# Patient Record
Sex: FEMALE
[De-identification: names, ages, dates, MRNs, and addresses within clinical notes are randomized per-mention and may not be internally consistent; named-entity substitution may affect disease eponyms.]

---

## 2024-07-02 ENCOUNTER — NON-APPOINTMENT (OUTPATIENT)
Age: 59
End: 2024-07-02

## 2024-07-03 ENCOUNTER — NON-APPOINTMENT (OUTPATIENT)
Age: 59
End: 2024-07-03

## 2024-07-03 ENCOUNTER — APPOINTMENT (OUTPATIENT)
Dept: ORTHOPEDIC SURGERY | Facility: CLINIC | Age: 59
End: 2024-07-03
Payer: COMMERCIAL

## 2024-07-03 VITALS — WEIGHT: 182 LBS | BODY MASS INDEX: 34.36 KG/M2 | HEIGHT: 61 IN

## 2024-07-03 DIAGNOSIS — M17.11 UNILATERAL PRIMARY OSTEOARTHRITIS, RIGHT KNEE: ICD-10-CM

## 2024-07-03 PROBLEM — Z00.00 ENCOUNTER FOR PREVENTIVE HEALTH EXAMINATION: Status: ACTIVE | Noted: 2024-07-03

## 2024-07-03 PROCEDURE — 99203 OFFICE O/P NEW LOW 30 MIN: CPT

## 2024-10-02 ENCOUNTER — APPOINTMENT (OUTPATIENT)
Age: 59
End: 2024-10-02

## 2024-10-02 VITALS
OXYGEN SATURATION: 98 % | HEART RATE: 84 BPM | WEIGHT: 175 LBS | BODY MASS INDEX: 34.36 KG/M2 | SYSTOLIC BLOOD PRESSURE: 139 MMHG | HEIGHT: 60 IN | DIASTOLIC BLOOD PRESSURE: 81 MMHG

## 2024-10-02 DIAGNOSIS — Z78.9 OTHER SPECIFIED HEALTH STATUS: ICD-10-CM

## 2024-10-02 DIAGNOSIS — M25.561 PAIN IN RIGHT KNEE: ICD-10-CM

## 2024-10-02 DIAGNOSIS — M17.11 UNILATERAL PRIMARY OSTEOARTHRITIS, RIGHT KNEE: ICD-10-CM

## 2024-10-02 PROCEDURE — 99204 OFFICE O/P NEW MOD 45 MIN: CPT

## 2024-10-02 RX ORDER — ACETAMINOPHEN 325 MG/1
TABLET ORAL
Refills: 0 | Status: ACTIVE | COMMUNITY

## 2024-10-11 NOTE — HISTORY OF PRESENT ILLNESS
[de-identified] : - Summary : Saira, a 59-year-old female, presents with right knee pain resulting from [place]. She had an MRI on August 29, 2024, which shows a complete radial tear of the posterior horn of the medial meniscus. X-rays show medial compartment osteoarthritis with joint space narrowing. - Chief Complaint (CC) : Right knee pain, worsened when getting up and walking. It feels like her knee is going to give out on her while walking. The pain has been mitigated slightly with ibuprofen. - History of Present Illness : [place], and though the pain isn't as severe as before, she still experiences discomfort in her knee, particularly when sitting and standing up. She was referred for physical therapy, which she stopped after being told she had a hairline fracture. She's noticing her knee feeling weak and unstable when walking for extended periods.

## 2024-10-11 NOTE — ASSESSMENT
[FreeTextEntry1] : - Right knee osteoarthritis chronic with acute exacerbation : Saira's history, symptoms, and imaging point towards right knee osteoarthritis, likely precipitated by her fall on the subway and compounded by her weight. The focus of her plan is to manage the arthritic symptoms and pain, and restore function. - Therapeutic Interventions: Recommence physical therapy with the aim to strengthen the muscles around her knee and manage the pain. Continue ibuprofen for pain management.  - Diagnostic Tests: There are no additional tests needed currently.  - Referrals: Restart physical therapy.  - Patient Education: Explained her diagnosis and treatment options. Advised weight loss to alleviate symptoms.  - Follow-Up: Meet after eight weeks of physical therapy to assess progress.  - Medial meniscal tear : Saira's MRI shows a complete radial tear of the posterior horn of the medial meniscus, which could also be contributing to the pain in her right knee. This condition, combined with the osteoarthritis, may make conservative management more challenging. - Therapeutic Interventions: Physical therapy to manage symptoms and restore function. Consideration of a steroid injection or gel injections in the future based on her response to physical therapy.  - Diagnostic Tests: No additional tests are needed at this time.  - Referrals: No additional referrals at this time.  - Patient Education: Explained the complications of her meniscal tear and potential for future treatments.  - Follow-Up: Revisit after physical therapy and possibly explore options for injections.  - Trabecular fracture : Her fall in July resulted in a trabecular fracture. The bruise from the fracture has likely healed at this point and is not clearly contributing to her current symptoms. Residual swelling may still be present. - Therapeutic Interventions: Continue with pain management. No specific interventions necessary for the fracture at this point.  - Diagnostic Tests: No further tests required.  - Referrals: No further referrals required.  - Patient Education: Explained the nature of her trabecicular fracture and its resolution.  - Follow-Up: No specific follow up needed for the fracture. General follow-up for her overall knee condition to be done in eight weeks.

## 2024-10-11 NOTE — PHYSICAL EXAM
[de-identified] : MRI shows a complete radial tear of the posterior horn of the medial meniscus. X-ray revealed medial compartment osteoarthritis with joint space narrowing. - independent interpretation

## 2024-12-02 ENCOUNTER — APPOINTMENT (OUTPATIENT)
Age: 59
End: 2024-12-02